# Patient Record
Sex: FEMALE | Race: OTHER | URBAN - METROPOLITAN AREA
[De-identification: names, ages, dates, MRNs, and addresses within clinical notes are randomized per-mention and may not be internally consistent; named-entity substitution may affect disease eponyms.]

---

## 2019-09-06 ENCOUNTER — OFFICE VISIT (OUTPATIENT)
Dept: OBGYN CLINIC | Facility: CLINIC | Age: 34
End: 2019-09-06
Payer: COMMERCIAL

## 2019-09-06 VITALS
BODY MASS INDEX: 24.27 KG/M2 | HEIGHT: 63 IN | DIASTOLIC BLOOD PRESSURE: 62 MMHG | SYSTOLIC BLOOD PRESSURE: 110 MMHG | WEIGHT: 137 LBS

## 2019-09-06 DIAGNOSIS — Z30.431 IUD CHECK UP: ICD-10-CM

## 2019-09-06 DIAGNOSIS — Z01.419 ENCOUNTER FOR WELL WOMAN EXAM: Primary | ICD-10-CM

## 2019-09-06 DIAGNOSIS — N89.8 VAGINAL ODOR: ICD-10-CM

## 2019-09-06 PROCEDURE — 99385 PREV VISIT NEW AGE 18-39: CPT | Performed by: OBSTETRICS & GYNECOLOGY

## 2019-09-06 NOTE — PROGRESS NOTES
HPI:    Patient ID: Ava Smith is a 30year old female. HPI  New patient well woman visit  45-year-old  2 para 2 who has Mirena IUD now for the 10th consecutive year. She wishes to remove it and intends to conceive.   She is bothered by [place] cervical adenopathy. Thyroid:  Normal size, shape, and position. No masses, tenderness, or nodules. Cardiovascular: Normal rate and regular rhythm. Pulmonary/Chest: Effort normal.   Abdominal: Soft.  Normal appearance and bowel sounds are normal. She breast exam.  First mammogram at age 38 unless family history or other. Contraception discussed. Recommend regular exercise and quality diet. Return to clinic in one year or as needed.   Orders Placed This Encounter      Hpv Dna  High Risk , Thin Prep Co

## 2019-09-09 ENCOUNTER — TELEPHONE (OUTPATIENT)
Dept: OBGYN CLINIC | Facility: CLINIC | Age: 34
End: 2019-09-09

## 2019-09-09 ENCOUNTER — OFFICE VISIT (OUTPATIENT)
Dept: OBGYN CLINIC | Facility: CLINIC | Age: 34
End: 2019-09-09

## 2019-09-09 VITALS — BODY MASS INDEX: 24 KG/M2 | DIASTOLIC BLOOD PRESSURE: 80 MMHG | SYSTOLIC BLOOD PRESSURE: 105 MMHG | WEIGHT: 137 LBS

## 2019-09-09 DIAGNOSIS — Z30.432 ENCOUNTER FOR IUD REMOVAL: Primary | ICD-10-CM

## 2019-09-09 LAB
GENITAL VAGINOSIS SCREEN: POSITIVE
HPV I/H RISK 1 DNA SPEC QL NAA+PROBE: NEGATIVE
TRICHOMONAS SCREEN: NEGATIVE

## 2019-09-09 PROCEDURE — 58301 REMOVE INTRAUTERINE DEVICE: CPT | Performed by: OBSTETRICS & GYNECOLOGY

## 2019-09-09 RX ORDER — METRONIDAZOLE 500 MG/1
500 TABLET ORAL 2 TIMES DAILY
Qty: 14 TABLET | Refills: 0 | Status: SHIPPED | OUTPATIENT
Start: 2019-09-09 | End: 2020-08-03

## 2019-09-09 RX ORDER — FLUCONAZOLE 150 MG/1
TABLET ORAL
Qty: 1 TABLET | Refills: 1 | Status: SHIPPED | OUTPATIENT
Start: 2019-09-09 | End: 2020-08-03

## 2019-09-09 NOTE — TELEPHONE ENCOUNTER
Pt informed of vaginal culture results, verbalized understanding. Pt prefers oral Flagyl. Rx will be sent.

## 2019-09-09 NOTE — TELEPHONE ENCOUNTER
----- Message from Ivette Russo MD sent at 9/7/2019 12:31 PM CDT -----  Please inform patient that her vaginal culture was positive for bacterial vaginosis (BV). This is due to an overgrowth of vaginal bacteria.   It is readily treated with an antibiot

## 2019-09-09 NOTE — PROCEDURES
Consent obtained. In lithotomy position, speculum placed and cervix visualized. Cervix prepped with Betadine. IUD strings grasped with ring forceps and removed. Tolerated well. No bleeding or pain.     Patient intends to use condoms and possibly try

## 2019-09-11 LAB
LAST PAP RESULT: NORMAL
PAP HISTORY (OTHER THAN LAST PAP): NORMAL

## 2020-08-03 ENCOUNTER — VIRTUAL PHONE E/M (OUTPATIENT)
Dept: FAMILY MEDICINE CLINIC | Facility: CLINIC | Age: 35
End: 2020-08-03

## 2020-08-03 ENCOUNTER — NURSE TRIAGE (OUTPATIENT)
Dept: FAMILY MEDICINE CLINIC | Facility: CLINIC | Age: 35
End: 2020-08-03

## 2020-08-03 DIAGNOSIS — Z20.822 CLOSE EXPOSURE TO COVID-19 VIRUS: Primary | ICD-10-CM

## 2020-08-03 DIAGNOSIS — J06.9 VIRAL UPPER RESPIRATORY TRACT INFECTION: ICD-10-CM

## 2020-08-03 PROCEDURE — 99213 OFFICE O/P EST LOW 20 MIN: CPT | Performed by: FAMILY MEDICINE

## 2020-08-03 NOTE — PROGRESS NOTES
Bernadette Bennett verbally consents to a Virtual/Telephone Check-In service on 08/03/20. Duration of Service:  5 minutes    Patient has been referred to the [facility] website at [email or web address] to review the yearly Consent to Treat document.     Patient

## 2020-08-03 NOTE — TELEPHONE ENCOUNTER
With assist of NICOL# [id number]  Action Requested: Summary for Provider     []  Critical Lab, Recommendations Needed  [] Need Additional Advice  []   FYI    []   Need Orders  [] Need Medications Sent to Pharmacy  []  Other     SUMMARY: FYI-tele visit ,New Pt,s/s

## 2020-08-04 ENCOUNTER — LAB ENCOUNTER (OUTPATIENT)
Dept: LAB | Facility: HOSPITAL | Age: 35
End: 2020-08-04
Attending: FAMILY MEDICINE
Payer: COMMERCIAL

## 2020-08-04 DIAGNOSIS — J06.9 VIRAL UPPER RESPIRATORY TRACT INFECTION: ICD-10-CM

## 2020-08-04 DIAGNOSIS — Z20.822 CLOSE EXPOSURE TO COVID-19 VIRUS: ICD-10-CM

## 2020-08-06 LAB — SARS-COV-2 RNA RESP QL NAA+PROBE: DETECTED

## 2021-11-08 ENCOUNTER — OFFICE VISIT (OUTPATIENT)
Dept: FAMILY MEDICINE CLINIC | Facility: CLINIC | Age: 36
End: 2021-11-08

## 2021-11-08 ENCOUNTER — LAB ENCOUNTER (OUTPATIENT)
Dept: LAB | Age: 36
End: 2021-11-08
Attending: FAMILY MEDICINE
Payer: COMMERCIAL

## 2021-11-08 VITALS
HEIGHT: 63 IN | HEART RATE: 76 BPM | TEMPERATURE: 97 F | WEIGHT: 131 LBS | SYSTOLIC BLOOD PRESSURE: 95 MMHG | BODY MASS INDEX: 23.21 KG/M2 | RESPIRATION RATE: 16 BRPM | DIASTOLIC BLOOD PRESSURE: 56 MMHG

## 2021-11-08 DIAGNOSIS — Z00.00 ROUTINE PHYSICAL EXAMINATION: ICD-10-CM

## 2021-11-08 DIAGNOSIS — Z00.00 ROUTINE PHYSICAL EXAMINATION: Primary | ICD-10-CM

## 2021-11-08 PROCEDURE — 84443 ASSAY THYROID STIM HORMONE: CPT

## 2021-11-08 PROCEDURE — 85027 COMPLETE CBC AUTOMATED: CPT

## 2021-11-08 PROCEDURE — 3074F SYST BP LT 130 MM HG: CPT | Performed by: FAMILY MEDICINE

## 2021-11-08 PROCEDURE — 99395 PREV VISIT EST AGE 18-39: CPT | Performed by: FAMILY MEDICINE

## 2021-11-08 PROCEDURE — 36415 COLL VENOUS BLD VENIPUNCTURE: CPT

## 2021-11-08 PROCEDURE — 3078F DIAST BP <80 MM HG: CPT | Performed by: FAMILY MEDICINE

## 2021-11-08 PROCEDURE — 80053 COMPREHEN METABOLIC PANEL: CPT

## 2021-11-08 PROCEDURE — 80061 LIPID PANEL: CPT

## 2021-11-08 PROCEDURE — 3008F BODY MASS INDEX DOCD: CPT | Performed by: FAMILY MEDICINE

## 2021-11-08 NOTE — PROGRESS NOTES
Subjective:   Patient ID: Gwen Wild is a 31year old female. Patient is here for routine physical exam and to establish care. No acute issues. No significant chronic medical problems. Patient is requesting testing. Diet and exercise have been good. is soft. Tenderness: There is no abdominal tenderness. There is no guarding or rebound. Genitourinary:     Vagina: Normal.   Musculoskeletal:         General: Normal range of motion. Cervical back: Normal range of motion and neck supple.    Lymp

## 2021-11-29 ENCOUNTER — OFFICE VISIT (OUTPATIENT)
Dept: OBGYN CLINIC | Facility: CLINIC | Age: 36
End: 2021-11-29

## 2021-11-29 VITALS
WEIGHT: 133 LBS | DIASTOLIC BLOOD PRESSURE: 75 MMHG | SYSTOLIC BLOOD PRESSURE: 117 MMHG | HEART RATE: 91 BPM | BODY MASS INDEX: 24 KG/M2

## 2021-11-29 DIAGNOSIS — Z01.419 WOMEN'S ANNUAL ROUTINE GYNECOLOGICAL EXAMINATION: Primary | ICD-10-CM

## 2021-11-29 PROBLEM — Z30.431 IUD CHECK UP: Status: RESOLVED | Noted: 2019-09-06 | Resolved: 2021-11-29

## 2021-11-29 PROBLEM — N89.8 VAGINAL ODOR: Status: RESOLVED | Noted: 2019-09-06 | Resolved: 2021-11-29

## 2021-11-29 PROCEDURE — 3074F SYST BP LT 130 MM HG: CPT | Performed by: OBSTETRICS & GYNECOLOGY

## 2021-11-29 PROCEDURE — 99395 PREV VISIT EST AGE 18-39: CPT | Performed by: OBSTETRICS & GYNECOLOGY

## 2021-11-29 PROCEDURE — 3078F DIAST BP <80 MM HG: CPT | Performed by: OBSTETRICS & GYNECOLOGY

## 2021-11-29 NOTE — PROGRESS NOTES
HPI:    Patient ID: Jannet Mansfield is a 31year old year old female. HPI  Well woman visit  No complaints. Menstrual cycles are regular. Not heavy or painful. Denies any breast or pelvic problems. Uses condoms for birth control.   Not thinking of con no masses. Perineum- normal without lesions  Anus-  Normal appearing without lesions.      ASSESSMENT/PLAN:    (Z01.419) Women's annual routine gynecological examination  (primary encounter diagnosis)  Plan: THINPREP PAP SMEAR B, HPV HIGH RISK , THIN PREP

## 2021-12-02 ENCOUNTER — TELEPHONE (OUTPATIENT)
Dept: OBGYN CLINIC | Facility: CLINIC | Age: 36
End: 2021-12-02

## 2021-12-02 NOTE — TELEPHONE ENCOUNTER
MyChart message sent to pt.    ----- Message from Aurelia Coombs MD sent at 12/2/2021  1:02 PM CST -----  Please notify by MyChart or letter of normal Pap test and normal HPV cotesting.

## 2022-09-15 ENCOUNTER — OFFICE VISIT (OUTPATIENT)
Dept: OBGYN CLINIC | Facility: CLINIC | Age: 37
End: 2022-09-15
Payer: COMMERCIAL

## 2022-09-15 VITALS — DIASTOLIC BLOOD PRESSURE: 77 MMHG | BODY MASS INDEX: 23 KG/M2 | SYSTOLIC BLOOD PRESSURE: 114 MMHG | WEIGHT: 132 LBS

## 2022-09-15 DIAGNOSIS — N89.8 VAGINAL DISCHARGE: Primary | ICD-10-CM

## 2022-09-15 PROCEDURE — 3078F DIAST BP <80 MM HG: CPT | Performed by: OBSTETRICS & GYNECOLOGY

## 2022-09-15 PROCEDURE — 99213 OFFICE O/P EST LOW 20 MIN: CPT | Performed by: OBSTETRICS & GYNECOLOGY

## 2022-09-15 PROCEDURE — 3074F SYST BP LT 130 MM HG: CPT | Performed by: OBSTETRICS & GYNECOLOGY

## 2022-09-17 LAB
GENITAL VAGINOSIS SCREEN: NEGATIVE
TRICHOMONAS SCREEN: NEGATIVE

## 2022-09-19 ENCOUNTER — TELEPHONE (OUTPATIENT)
Dept: OBGYN CLINIC | Facility: CLINIC | Age: 37
End: 2022-09-19

## 2023-09-18 NOTE — TELEPHONE ENCOUNTER
[facility] message sent to pt.    ----- Message from Jacob Odom MD sent at 9/19/2022  9:55 AM CDT -----  Please inform by [facility]igating [facility], mail, or call of normal laboratory tests-- vaginal culture negative Elidel Counseling: Patient may experience a mild burning sensation during topical application. Elidel is not approved in children less than 2 years of age. There have been case reports of hematologic and skin malignancies in patients using topical calcineurin inhibitors although causality is questionable.

## (undated) NOTE — MR AVS SNAPSHOT
After Visit Summary   9/6/2019    Angela Shrestha    MRN: [id number]           Visit Information     Date & Time  9/6/2019  3:10 PM Provider  Parul Mahmood MD Department  [address] [facility], [address] [place] [address] Dept.  Phone  [phone]      Your non-emergency, consider your options before heading to an ER.          SAME DAY  APPOINTMENTS  Available at primary care offices      [facility]     Treatment for mild  illness or inj

## (undated) NOTE — LETTER
9/12/2019              Joel Pruitt        [address]         Dear Stuart Powell,    It was a pleasure to see you. Your  Pap and Hpv cotesting was normal.  There is no need for further testing at this time.   I look forward to s

## (undated) NOTE — MR AVS SNAPSHOT
After Visit Summary   11/29/2021    Angela Shrestha   MRN: [id number]           Visit Information     Date & Time  11/29/2021  1:00 PM Provider  Parul Mahmood MD Department  [address] [facility], [address] [place] [address] Dept.  Phone  [phone] on [employer], please visit [email or web address]         No text in SmartText       No text in SmartText

## (undated) NOTE — LETTER
AUTHORIZATION FOR SURGICAL OPERATION OR OTHER PROCEDURE    1.  I hereby authorize Dr. Meg Valladares, and [facility], [facility] staff assigned to my case to perform the following operation and/or procedure at the [facility], [facility]: IUD removal  ________________ Patient signature:  ___________________________________________________             Relationship to Patient:           []  Parent    Responsible person                          []  Spouse  In case of minor or                    [] Other  _____________